# Patient Record
Sex: MALE | Race: WHITE | NOT HISPANIC OR LATINO | Employment: OTHER | URBAN - METROPOLITAN AREA
[De-identification: names, ages, dates, MRNs, and addresses within clinical notes are randomized per-mention and may not be internally consistent; named-entity substitution may affect disease eponyms.]

---

## 2017-03-21 ENCOUNTER — OFFICE VISIT (OUTPATIENT)
Dept: OTOLARYNGOLOGY | Facility: CLINIC | Age: 37
End: 2017-03-21
Payer: COMMERCIAL

## 2017-03-21 ENCOUNTER — TELEPHONE (OUTPATIENT)
Dept: SPEECH THERAPY | Facility: HOSPITAL | Age: 37
End: 2017-03-21

## 2017-03-21 VITALS
BODY MASS INDEX: 32.62 KG/M2 | WEIGHT: 233 LBS | DIASTOLIC BLOOD PRESSURE: 87 MMHG | HEIGHT: 71 IN | HEART RATE: 73 BPM | SYSTOLIC BLOOD PRESSURE: 122 MMHG

## 2017-03-21 DIAGNOSIS — R49.0 DYSPHONIA: ICD-10-CM

## 2017-03-21 DIAGNOSIS — R09.A2 GLOBUS SENSATION: ICD-10-CM

## 2017-03-21 DIAGNOSIS — J38.1 VOCAL FOLD POLYP: ICD-10-CM

## 2017-03-21 DIAGNOSIS — R49.8 VOICE STRAIN: ICD-10-CM

## 2017-03-21 DIAGNOSIS — R49.0 DYSPHONIA: Primary | ICD-10-CM

## 2017-03-21 DIAGNOSIS — K21.9 LPRD (LARYNGOPHARYNGEAL REFLUX DISEASE): Primary | ICD-10-CM

## 2017-03-21 DIAGNOSIS — R09.89 CHRONIC THROAT CLEARING: ICD-10-CM

## 2017-03-21 PROCEDURE — 31575 DIAGNOSTIC LARYNGOSCOPY: CPT | Mod: S$GLB,,, | Performed by: NURSE PRACTITIONER

## 2017-03-21 PROCEDURE — 1160F RVW MEDS BY RX/DR IN RCRD: CPT | Mod: S$GLB,,, | Performed by: NURSE PRACTITIONER

## 2017-03-21 PROCEDURE — 99999 PR PBB SHADOW E&M-NEW PATIENT-LVL III: CPT | Mod: PBBFAC,,, | Performed by: NURSE PRACTITIONER

## 2017-03-21 PROCEDURE — 99203 OFFICE O/P NEW LOW 30 MIN: CPT | Mod: 25,S$GLB,, | Performed by: NURSE PRACTITIONER

## 2017-03-21 RX ORDER — OMEPRAZOLE 40 MG/1
40 CAPSULE, DELAYED RELEASE ORAL
Qty: 30 CAPSULE | Refills: 11 | Status: SHIPPED | OUTPATIENT
Start: 2017-03-21 | End: 2018-05-07 | Stop reason: ALTCHOICE

## 2017-03-21 NOTE — PROGRESS NOTES
Subjective:       Patient ID: Marco Marie is a 36 y.o. male.    Chief Complaint: No chief complaint on file.    HPI   Patient reports that for at least the past 6 months he has been frequently hoarse, sensation of post-nasal drip, mucus sensation on or around the vocal cords make him feel like it could possible choke him. He has to speak on the phone and project his voice more forcefully when hoarseness. He works in IT, frequently talking to clients, voice breaks due to excess strain. PMH of HPV/condyloma, no mention of whether this has ever affected his aerodigestive tract.     Review of Systems   Constitutional: Negative.    HENT: Positive for postnasal drip and voice change. Negative for congestion, dental problem, rhinorrhea, sinus pressure, sneezing, sore throat and trouble swallowing.         Frequent throat clearing  Sensation of thick or too much mucus around or on vocal cords, feels like it could possible choke him  Lump sensation in the back of his throat   Eyes: Negative.  Negative for discharge, redness and itching.   Respiratory: Positive for choking.    Cardiovascular: Negative.    Gastrointestinal: Negative.    Musculoskeletal: Negative.    Skin: Negative.    Neurological: Negative.    Hematological: Negative.    Psychiatric/Behavioral: Negative.        Objective:      Physical Exam   Constitutional: He is oriented to person, place, and time. Vital signs are normal. He appears well-developed and well-nourished. He is cooperative. He does not appear ill. No distress.   HENT:   Head: Normocephalic and atraumatic.   Right Ear: Hearing, tympanic membrane, external ear and ear canal normal. Tympanic membrane is not erythematous. No middle ear effusion.   Left Ear: Hearing, tympanic membrane, external ear and ear canal normal. Tympanic membrane is not erythematous.  No middle ear effusion.   Nose: Nose normal. No mucosal edema or rhinorrhea. Right sinus exhibits no maxillary sinus tenderness and no  frontal sinus tenderness. Left sinus exhibits no maxillary sinus tenderness and no frontal sinus tenderness.   Mouth/Throat: Uvula is midline, oropharynx is clear and moist and mucous membranes are normal. Mucous membranes are not pale, not dry and not cyanotic. No oral lesions. No oropharyngeal exudate, posterior oropharyngeal edema or posterior oropharyngeal erythema.   Eyes: Conjunctivae, EOM and lids are normal. Pupils are equal, round, and reactive to light. Right eye exhibits no discharge. Left eye exhibits no discharge. No scleral icterus.   Neck: Trachea normal and normal range of motion. Neck supple. No tracheal deviation present. No thyroid mass and no thyromegaly present.   Cardiovascular: Normal rate.    Pulmonary/Chest: Effort normal. No stridor. No respiratory distress. He has no wheezes.   Musculoskeletal: Normal range of motion.   Lymphadenopathy:        Head (right side): No submental, no submandibular, no tonsillar, no preauricular and no posterior auricular adenopathy present.        Head (left side): No submental, no submandibular, no tonsillar, no preauricular and no posterior auricular adenopathy present.     He has no cervical adenopathy.        Right cervical: No superficial cervical and no posterior cervical adenopathy present.       Left cervical: No superficial cervical and no posterior cervical adenopathy present.   Neurological: He is alert and oriented to person, place, and time. He has normal strength. Coordination and gait normal.   Skin: Skin is warm, dry and intact. No lesion and no rash noted. He is not diaphoretic. No cyanosis. No pallor.   Psychiatric: He has a normal mood and affect. His speech is normal and behavior is normal. Judgment and thought content normal. Cognition and memory are normal.   Nursing note and vitals reviewed.      Procedure: Flexible laryngoscopy    In order to fully examine the upper aerodigestive tract, including the larynx, in a patient with a  hyperactive gag reflex, and suboptimal visualization with indirect mirror exam,  flexible endoscopy is required.   After explaining the procedure and obtaining verbal consent, a timeout was performed with the patient's participation according to the universal protocol. Both nasal cavities were anesthetized with 4% Xylocaine spray mixed with Bridger-Synephrine. The flexible laryngoscope  was inserted into the nasal cavity and advanced to visualize the nasal cavity, nasopharynx, the posterior oropharynx, hypopharynx, and the endolarynx with the  findings noted. The scope was removed and the procedure terminated. The patient tolerated this procedure well without apparent complication.     OVERALL FINDINGS  Nasopharynx - the torus is clear. There are no lesions of the posterior wall.   Oropharynx - no lesions of the tongue base. There is no obvious fullness or asymmetry.  Hypopharynx - there are no lesions of the pyriform sinuses or postcricoid region   Larynx - there are no lesions of the supraglottic or glottic larynx.  Vocal fold mobility is normal.     SPECIFIC FINDINGS  Adenoid tissue - normal   Nasopharynx & eustachian tube orifices - normal   Posterior pharyngeal wall - normal   Base of tongue - normal   Epiglottis - normal   Valleculae - normal   Pyriform sinuses - normal   False vocal cords - normal   True vocal cords - left anterior medial polyp  Arytenoids - normal   Interarytenoid space - erythema, edema           Assessment:     Dysphonia    LPRD/GERD  Left TVC polyp  Plan:     Laryngoscope photos given and reviewed in detail with patient. Recommend omeprazole QAM on an empty stomach, ranitidine QHS, and establish care with GI for continued management and possible EGD. Handouts given on LPRD and GERD, and discussed at length with patient.     See Dr. Jimenes regarding left VC polyp (given h/o HPV)

## 2017-03-21 NOTE — MR AVS SNAPSHOT
"    [place] [facility]  [address]  [place] [address]  Phone: [phone]  Fax: [phone]                  Marco Marie   3/21/2017 9:20 AM   Office Visit    Description:  Male : 1980   Provider:  Alida Lind NP   Department:  [place] [facility]           Reason for Visit     Post nasal drip     Hoarse     Sore Throat                To Do List           Goals (5 Years of Data)     None      [facility] -  Assistance  Registered nurses in the [facility] provide clinical advisement, health education, appointment booking, and other advisory services.  Call for this free service at [phone].             Medications           Message regarding Medications     Verify the changes and/or additions to your medication regime listed below are the same as discussed with your clinician today.  If any of these changes or additions are incorrect, please notify your healthcare provider.             Verify that the below list of medications is an accurate representation of the medications you are currently taking.  If none reported, the list may be blank. If incorrect, please contact your healthcare provider. Carry this list with you in case of emergency.           Current Medications     doxycycline (VIBRAMYCIN) 100 MG capsule     hydrocodone-acetaminophen (LORTAB)  mg per tablet     ketorolac (TORADOL) 10 mg tablet Take 10 mg by mouth every 8 (eight) hours.             Clinical Reference Information           Your Vitals Were     BP Pulse Height Weight BMI    122/87 73 5' 11" (1.803 m) 105.7 kg (233 lb 0.4 oz) 32.5 kg/m2      Blood Pressure          Most Recent Value    BP  122/87      Allergies as of 3/21/2017     No Known Drug Allergies      Immunizations Administered on Date of Encounter - 3/21/2017     None      Instructions      How Acid Reflux Affects Your Throat    Do you have to clear your throat or cough often? Are you hoarse? Do " "you have trouble swallowing? If you have these or other throat symptoms, you may have acid reflux. This occurs when stomach acid flows back up and irritates your throat.  Why you have throat symptoms  There are muscles (esophageal sphincters) at both ends of the tube that carries food to your stomach (the esophagus). These muscles relax to let food pass. Then they tighten to keep stomach acid down. When the lower esophageal sphincter (LES) doesnt tighten enough, acid can flow back (reflux) from your stomach into your esophagus. This may cause heartburn. In some cases the upper esophageal sphincter (UES) also doesnt work well. Then acid can travel higher and enter your throat (pharynx). In many cases, this causes throat symptoms.  Common throat symptoms  · Need to clear your throat often  · Feeling like youre choking  · Long-term (chronic) cough  · Hoarseness  · Trouble swallowing  · Feel like you have a lump in your throat  · Sour or acid taste  · Sore throat that keeps coming back     LARYNGOPHARYNGEAL REFLUX  (SILENT OR ATYPICAL REFLUX)    If you have any of the following symptoms you may have laryngopharyngeal reflux (LPR):  hoarseness, thick or too much mucus, chronic throat pain/irritation, chronic throat clearing, chronic cough, especially cough that wake you up from sleep, chronic "postnasal drip" without the need to blow your nose.     Many people with LPR do not have symptoms of heartburn. Compared to the esophagus, the voice box and the back of the throat are significantly more sensitive to the effects of acid on surrounding tissue. Acid passing quickly through the esophagus does not have a chance to irritate the area for too long.  However acid that pools in the throat or voice box can cause prolonged irritation resulting in the symptoms of LPR.    The symptoms of LPR can consist of a dry cough and the sensation of something being stuck in the throat.  Some people will complain of heartburn while " "others may have intermittent hoarseness or loss of voice.  Another major symptom of LPR is "postnasal drip."  Patients are often told symptoms are due to abnormal nasal drainage or sinus infection; however this is rarely the cause of chronic throat irritation. For post nasal drip to cause the complaints described, signs and symptoms of an active nasal infection should be present.     Treatments for LPR include:  postural changes, weight reduction, diet modification, medication to reduce stomach acid and promote normal motility, and surgery to prevent reflux. Most patients will begin to notice some relief in her symptoms about 2 weeks after starting the medication; however it is generally recommended the medication should be continued for 2 months. If the symptoms completely resolve, the medication can then be tapered.  Some people will remain symptom free while others may have relapses which required treatment again.    Things you can do to prevent reflux include:  Do not smoke.  Smoking will cause reflux.  Avoid tight fitting clothes or belts around the waist.  Avoid eating at least 2 hours prior to bedtime.  In fact avoid eating your largest meal at night.  Weight loss.  For patient's with recent weight gain, shedding a few pounds is all that is required to improve reflux.  Avoid caffeine, cola beverages, citrus beverages, mints, alcoholic beverages, particularly at night, cheese, fried foods, spicy foods, eggs, and chocolate.  Sleep with the head of bed elevated at least 6 inches.    Take Omeprazole / Prilosec (PPI) every morning on an empty stomach (30-60 minutes before eating) 20-40 MG. At bedtime take Zantac (H2-blocker) 150-300 mg.  All are available over-the-counter without a prescription. See a Gastro doctor (GI) for further evaluation and continued management.    Dr. Benjie Jimenes [phone]             Language Assistance Services     ATTENTION: Language assistance services are available, free of " charge. Please call [phone].      ATENCIÓN: Si habla español, tiene a kingsley disposición servicios gratuitos de asistencia lingüística. Llame al [phone].     CHÚ Ý: N?u b?n nói Ti?ng Vi?t, có các d?ch v? h? tr? ngôn ng? mi?n phí dành cho b?n. G?i s? [phone].         [facility] complies with applicable Federal civil rights laws and does not discriminate on the basis of race, color, national origin, age, disability, or sex.

## 2017-03-21 NOTE — PATIENT INSTRUCTIONS
"  How Acid Reflux Affects Your Throat    Do you have to clear your throat or cough often? Are you hoarse? Do you have trouble swallowing? If you have these or other throat symptoms, you may have acid reflux. This occurs when stomach acid flows back up and irritates your throat.  Why you have throat symptoms  There are muscles (esophageal sphincters) at both ends of the tube that carries food to your stomach (the esophagus). These muscles relax to let food pass. Then they tighten to keep stomach acid down. When the lower esophageal sphincter (LES) doesnt tighten enough, acid can flow back (reflux) from your stomach into your esophagus. This may cause heartburn. In some cases the upper esophageal sphincter (UES) also doesnt work well. Then acid can travel higher and enter your throat (pharynx). In many cases, this causes throat symptoms.  Common throat symptoms  · Need to clear your throat often  · Feeling like youre choking  · Long-term (chronic) cough  · Hoarseness  · Trouble swallowing  · Feel like you have a lump in your throat  · Sour or acid taste  · Sore throat that keeps coming back     LARYNGOPHARYNGEAL REFLUX  (SILENT OR ATYPICAL REFLUX)    If you have any of the following symptoms you may have laryngopharyngeal reflux (LPR):  hoarseness, thick or too much mucus, chronic throat pain/irritation, chronic throat clearing, chronic cough, especially cough that wake you up from sleep, chronic "postnasal drip" without the need to blow your nose.     Many people with LPR do not have symptoms of heartburn. Compared to the esophagus, the voice box and the back of the throat are significantly more sensitive to the effects of acid on surrounding tissue. Acid passing quickly through the esophagus does not have a chance to irritate the area for too long.  However acid that pools in the throat or voice box can cause prolonged irritation resulting in the symptoms of LPR.    The symptoms of LPR can consist of a dry cough " "and the sensation of something being stuck in the throat.  Some people will complain of heartburn while others may have intermittent hoarseness or loss of voice.  Another major symptom of LPR is "postnasal drip."  Patients are often told symptoms are due to abnormal nasal drainage or sinus infection; however this is rarely the cause of chronic throat irritation. For post nasal drip to cause the complaints described, signs and symptoms of an active nasal infection should be present.     Treatments for LPR include:  postural changes, weight reduction, diet modification, medication to reduce stomach acid and promote normal motility, and surgery to prevent reflux. Most patients will begin to notice some relief in her symptoms about 2 weeks after starting the medication; however it is generally recommended the medication should be continued for 2 months. If the symptoms completely resolve, the medication can then be tapered.  Some people will remain symptom free while others may have relapses which required treatment again.    Things you can do to prevent reflux include:  Do not smoke.  Smoking will cause reflux.  Avoid tight fitting clothes or belts around the waist.  Avoid eating at least 2 hours prior to bedtime.  In fact avoid eating your largest meal at night.  Weight loss.  For patient's with recent weight gain, shedding a few pounds is all that is required to improve reflux.  Avoid caffeine, cola beverages, citrus beverages, mints, alcoholic beverages, particularly at night, cheese, fried foods, spicy foods, eggs, and chocolate.  Sleep with the head of bed elevated at least 6 inches.    Take Omeprazole / Prilosec (PPI) every morning on an empty stomach (30-60 minutes before eating) 20-40 MG. At bedtime take Zantac (H2-blocker) 150-300 mg.  All are available over-the-counter without a prescription. See a Gastro doctor (GI) for further evaluation and continued management.    Dr. Benjie Jimenes [phone]        "

## 2017-04-10 ENCOUNTER — CLINICAL SUPPORT (OUTPATIENT)
Dept: SPEECH THERAPY | Facility: HOSPITAL | Age: 37
End: 2017-04-10
Payer: COMMERCIAL

## 2017-04-10 ENCOUNTER — INITIAL CONSULT (OUTPATIENT)
Dept: OTOLARYNGOLOGY | Facility: CLINIC | Age: 37
End: 2017-04-10
Payer: COMMERCIAL

## 2017-04-10 VITALS
SYSTOLIC BLOOD PRESSURE: 112 MMHG | DIASTOLIC BLOOD PRESSURE: 70 MMHG | HEART RATE: 50 BPM | WEIGHT: 232.56 LBS | BODY MASS INDEX: 32.44 KG/M2

## 2017-04-10 DIAGNOSIS — J38.1 VOCAL FOLD POLYP: ICD-10-CM

## 2017-04-10 DIAGNOSIS — F44.4 HYPERFUNCTIONAL DYSPHONIA: ICD-10-CM

## 2017-04-10 DIAGNOSIS — R49.9 VOICE DISTURBANCE: Primary | ICD-10-CM

## 2017-04-10 DIAGNOSIS — J38.3 VOCAL FOLD SCAR: ICD-10-CM

## 2017-04-10 DIAGNOSIS — R49.0 DYSPHONIA: ICD-10-CM

## 2017-04-10 PROCEDURE — G9172 VOICE GOAL STATUS: HCPCS | Mod: GN,CH | Performed by: SPEECH-LANGUAGE PATHOLOGIST

## 2017-04-10 PROCEDURE — 99999 PR PBB SHADOW E&M-EST. PATIENT-LVL III: CPT | Mod: PBBFAC,,, | Performed by: OTOLARYNGOLOGY

## 2017-04-10 PROCEDURE — G9173 VOICE D/C STATUS: HCPCS | Mod: GN,CJ | Performed by: SPEECH-LANGUAGE PATHOLOGIST

## 2017-04-10 PROCEDURE — 92524 BEHAVRAL QUALIT ANALYS VOICE: CPT | Mod: GN | Performed by: SPEECH-LANGUAGE PATHOLOGIST

## 2017-04-10 PROCEDURE — G9171 VOICE CURRENT STATUS: HCPCS | Mod: GN,CK | Performed by: SPEECH-LANGUAGE PATHOLOGIST

## 2017-04-10 PROCEDURE — 31579 LARYNGOSCOPY TELESCOPIC: CPT | Mod: S$GLB,,, | Performed by: OTOLARYNGOLOGY

## 2017-04-10 PROCEDURE — 99213 OFFICE O/P EST LOW 20 MIN: CPT | Mod: 25,S$GLB,, | Performed by: OTOLARYNGOLOGY

## 2017-04-10 PROCEDURE — 1160F RVW MEDS BY RX/DR IN RCRD: CPT | Mod: S$GLB,,, | Performed by: OTOLARYNGOLOGY

## 2017-04-10 PROCEDURE — 92520 LARYNGEAL FUNCTION STUDIES: CPT | Mod: GN | Performed by: SPEECH-LANGUAGE PATHOLOGIST

## 2017-04-10 NOTE — PATIENT INSTRUCTIONS
ACID REFLUX   What is acid reflux?    When we eat, food passes from the throat and into the stomach through a tube called the esophagus. At the bottom of the esophagus is a ring of muscles that acts as a valve between the esophagus and stomach, called the lower esophageal sphincter. Smoking, alcohol, and certain types of food may weaken the sphincter, so it may stop closing properly. The contents in the stomach then may leak back, or reflux, into the esophagus. This problem is called gastroesophageal reflux disease (GERD). Symptoms of GERD include heartburn, belching, regurgitation of stomach contents, and swallowing difficulties.    Sometimes, the stomach acid travels up through the esophagus and spills into the larynx or pharynx (voice box). This is called laryngopharyngeal reflux (LPR) and is irritating to the vocal folds and surrounding tissues. Often, patients with LPR do not experience heartburn as a symptom. More commonly, symptoms of LPR include hoarseness, excessive mucous resulting in frequent throat clearing, post-nasal drip, coughing, throat soreness or burning, choking episodes, difficulty swallowing, and sensation of a lump in the throat.     How is acid reflux treated?   Treatment for acid reflux can involve any combination of medication, lifestyle modifications, and surgery.   · Medications. Your doctor may prescribe a proton pump inhibitor (PPI) or an H2 blocker. If you are prescribed a PPI, take in on an empty stomach in the morning 30 minutes prior to eating breakfast. Keep in mind that it may take 4-6 weeks before symptoms begin to resolve, so do not stop medications without consulting your doctor.   · Lifestyle and dietary modifications. Eat smaller meals at a slower pace. Avoid over-eating. If you are overweight, try to lose weight. Do not lie down or exercise directly after eating; eat your last meal of the day at least 2-3 hours prior to going to sleep. Avoid tight-fitting clothes. If you  are a smoker, reduce or quit smoking. Elevate your head of bed 4-6 inches by putting phone books under the legs at the head of your bed or buy a wedge pillow, but do not use more than two regular pillows as this causes the body to curl and compresses your stomach.     Food group Foods to avoid to reduce reflux   Beverages  Whole milk, 2% milk, chocolate milk/hot chocolate, alcohol, coffee (regular and decaf), caffeinated tea, mint tea, carbonated beverages, citrus juice    Breads/grains Commercial sweet rolls, doughnuts, croissants, and other high-fat pastries    Fruits and vegetables Fried or cream-style vegetables, tomatoes, tomato-based products, citrus fruits, hot peppers    Soups and seasonings Cream, cheese, tomato-based soups, vinegar    Meats and proteins Fatty or fried meat/fish, barber, sausage, pepperoni, lunch meat, fried eggs    Fats and oil Lard, barber drippings, salt pork, meat drippings, gravies, highly seasoned salad dressings, nuts    Sweets/desserts Anything made with or from chocolate, peppermint, spearmint, whole milk, or cream; high-fat pastries, gum, hard candy         THROAT CLEARING     Why am I clearing my throat so often?   Irritation of the vocal folds and surrounding area can cause the urge to clear your throat. This irritation can be caused by acid reflux, allergies, or environmental factors, as well as from a cold or sore throat. Talk with your doctor about the treatment of possible underlying causes. However, throat clearing can turn into a cycle. You clear your throat after feeling an irritation, which causes more irritation, which causes you to continue clearing your throat, which causes more irritation.     What can I do about it?   · Ask a friend or family member to help you keep track - you may not even realize how often you do it.   · Replace the throat clearing with a different behavior.   · Take a sip of water and then swallow. Repeat until the sensation subsides.  · Swallow  hard (even without water)   · Use the silent throat clear method by making the h sound when you exhale  · Breathe in deeply through your nose and exhale on shhh   · Hum quietly   · Keep yourself hydrated.   · Drink plenty of water   · Eat wet snacks (grapes, apples, melon, cucumber)   · Use a humidifier at home or at work   · Suck on hard candies, but avoid too much sugar and avoid anything with mint, menthol, camphor, or eucaplyptus, as these will have a more irritating effect.        VOCAL HYGIENE AND HYDRATION RECOMMENDATIONS     DO   · Drink plenty of waterabout  oz a day! If your urine is pale, you should be well-hydrated.  Try some of these tips to increase hydration as well.  · Eat wet snacks such as grapes, melon, cucumbers, apple slices   · Reduce intake coffee and other caffeinated and carbonated beverages   · Suck on pastille lozenges or sugar free candies (not mentholated lozenges)   · Use a room or personal humidifier   · Use sinus rinses/irrigations or nasal saline spray   · Inhale steam for a few minutes before speaking or singing   · Pay attention to how, and how much, you use your voice.   · Give yourself vocal breaks throughout the day.   · Breathe when talking, take frequent pauses for breath.   · Use a microphone when speaking in front of a group of 15 or more to reduce voice strain.   · Learn how to use your voice correctly to get loud with voice therapy techniques.  · Stay healthy. Eat right and get plenty of rest. Follow a reflux precaution diet if indicated.   ____________________________________________________________________    REDUCE   · Loud talking, yelling, cheering, or screaming. Voice injury can take place over a long time, or all at once.  If you need to talk loud or yell, be sure you are doing it properly.   · Clearing your throat and forceful coughing. The vocal folds collect mucus when irritated or inflamed and this can cause the urge to clear your throat. The  trauma of clearing the throat creates more inflammation and mucus. See tips above for keeping hydrated to assist with cutting back on throat clearing.  Instead of clearing your throat, try these behaviors until the sensation passes:   · Take a sip of water   · Silently clear with a hard exhale making an hhh sound   · Swallow hard   · Drying agents such as anti-histamines or decongestant medications. You should always take medications as prescribed, but keep in mind these will dry you out, so increase your hydration!   ____________________________________________________________________    AVOID   · Inhalant irritants such as smoke, strong fumes, and allergens. Take advantage of 1-800-QUIT-NOW if you are ready to stop smoking.   · Unnecessary non-speech noises, such as grunting during exercise, loud noises, or excessively high- or low-pitched sounds. Save your voice for talking and singing!   · Whispering. Whispering places your vocal folds in a tenser position than usual.

## 2017-04-10 NOTE — LETTER
April 11, 2017      Alida Lind NP  [address] [facility] [address] [place] [address]           [facility]  [address]  [facility] [address]  Phone: [phone]  Fax: [phone]          Patient: Marco Marie   MR Number: [id number]   YOB: 1980   Date of Visit: 4/10/2017       Dear Alida Lind:    Thank you for referring Marco Marie to me for evaluation. Attached you will find relevant portions of my assessment and plan of care.    If you have questions, please do not hesitate to call me. I look forward to following Marco Marie along with you.    Sincerely,    Benjie Jimenes MD    Enclosure  CC:  No Recipients    If you would like to receive this communication electronically, please contact [email or web address] or [phone] to request more information on [employer] Link access.    For providers and/or their staff who would like to refer a patient to [facility], please contact us through our one-stop-shop provider referral line, [facility], at [phone].    If you feel you have received this communication in error or would no longer like to receive these types of communications, please e-mail [email or web address]

## 2017-04-10 NOTE — MR AVS SNAPSHOT
[facility]  [address]  [facility] [address]  Phone: [phone]  Fax: [phone]                  Marco Marie   4/10/2017 8:40 AM   Initial consult    Description:  Male : 1980   Provider:  Benjie Jimenes MD   Department:  [facility]           Reason for Visit     polyp back of throat           Diagnoses this Visit        Comments    Voice disturbance    -  Primary     Hyperfunctional dysphonia         Vocal fold polyp         Vocal fold scar                To Do List           Future Appointments        Provider Department Dept Phone    4/10/2017 11:00 AM Geneva Osullivan, [facility] [phone]    2017 2:00 PM JUAN De Anda [facility] - Allergy/ Immunology [phone]      Goals (5 Years of Data)     None      Follow-Up and Disposition     Return in about 2 months (around 6/10/2017).      [facility]     [facility] Care [facility] -  Assistance  Unless otherwise directed by your provider, please contact [facility], our nurse care line that is available for  assistance.     Registered nurses in the [facility] provide: appointment scheduling, clinical advisement, health education, and other advisory services.  Call: [phone] (toll free)               Medications           Message regarding Medications     Verify the changes and/or additions to your medication regime listed below are the same as discussed with your clinician today.  If any of these changes or additions are incorrect, please notify your healthcare provider.             Verify that the below list of medications is an accurate representation of the medications you are currently taking.  If none reported, the list may be blank. If incorrect, please contact your healthcare provider. Carry this list with you in case of emergency.           Current Medications     doxycycline (VIBRAMYCIN)  100 MG capsule     hydrocodone-acetaminophen (LORTAB)  mg per tablet     ketorolac (TORADOL) 10 mg tablet Take 10 mg by mouth every 8 (eight) hours.      omeprazole (PRILOSEC) 40 MG capsule Take 1 capsule (40 mg total) by mouth before breakfast. Take on empty stomach 30-60 minutes before meal    ranitidine (ZANTAC) 300 MG tablet Take 1 tablet (300 mg total) by mouth every evening.           Clinical Reference Information           Your Vitals Were     BP Pulse Weight BMI       112/70 50 105.5 kg (232 lb 9.4 oz) 32.44 kg/m2       Blood Pressure          Most Recent Value    BP  112/70      Allergies as of 4/10/2017     No Known Drug Allergies      Immunizations Administered on Date of Encounter - 4/10/2017     None      Instructions      ACID REFLUX   What is acid reflux?    When we eat, food passes from the throat and into the stomach through a tube called the esophagus. At the bottom of the esophagus is a ring of muscles that acts as a valve between the esophagus and stomach, called the lower esophageal sphincter. Smoking, alcohol, and certain types of food may weaken the sphincter, so it may stop closing properly. The contents in the stomach then may leak back, or reflux, into the esophagus. This problem is called gastroesophageal reflux disease (GERD). Symptoms of GERD include heartburn, belching, regurgitation of stomach contents, and swallowing difficulties.    Sometimes, the stomach acid travels up through the esophagus and spills into the larynx or pharynx (voice box). This is called laryngopharyngeal reflux (LPR) and is irritating to the vocal folds and surrounding tissues. Often, patients with LPR do not experience heartburn as a symptom. More commonly, symptoms of LPR include hoarseness, excessive mucous resulting in frequent throat clearing, post-nasal drip, coughing, throat soreness or burning, choking episodes, difficulty swallowing, and sensation of a lump in the throat.     How is acid reflux  treated?   Treatment for acid reflux can involve any combination of medication, lifestyle modifications, and surgery.   · Medications. Your doctor may prescribe a proton pump inhibitor (PPI) or an H2 blocker. If you are prescribed a PPI, take in on an empty stomach in the morning 30 minutes prior to eating breakfast. Keep in mind that it may take 4-6 weeks before symptoms begin to resolve, so do not stop medications without consulting your doctor.   · Lifestyle and dietary modifications. Eat smaller meals at a slower pace. Avoid over-eating. If you are overweight, try to lose weight. Do not lie down or exercise directly after eating; eat your last meal of the day at least 2-3 hours prior to going to sleep. Avoid tight-fitting clothes. If you are a smoker, reduce or quit smoking. Elevate your head of bed 4-6 inches by putting phone books under the legs at the head of your bed or buy a wedge pillow, but do not use more than two regular pillows as this causes the body to curl and compresses your stomach.     Food group Foods to avoid to reduce reflux   Beverages  Whole milk, 2% milk, chocolate milk/hot chocolate, alcohol, coffee (regular and decaf), caffeinated tea, mint tea, carbonated beverages, citrus juice    Breads/grains Commercial sweet rolls, doughnuts, croissants, and other high-fat pastries    Fruits and vegetables Fried or cream-style vegetables, tomatoes, tomato-based products, citrus fruits, hot peppers    Soups and seasonings Cream, cheese, tomato-based soups, vinegar    Meats and proteins Fatty or fried meat/fish, barber, sausage, pepperoni, lunch meat, fried eggs    Fats and oil Lard, barber drippings, salt pork, meat drippings, gravies, highly seasoned salad dressings, nuts    Sweets/desserts Anything made with or from chocolate, peppermint, spearmint, whole milk, or cream; high-fat pastries, gum, hard candy         THROAT CLEARING     Why am I clearing my throat so often?   Irritation of the vocal folds  and surrounding area can cause the urge to clear your throat. This irritation can be caused by acid reflux, allergies, or environmental factors, as well as from a cold or sore throat. Talk with your doctor about the treatment of possible underlying causes. However, throat clearing can turn into a cycle. You clear your throat after feeling an irritation, which causes more irritation, which causes you to continue clearing your throat, which causes more irritation.     What can I do about it?   · Ask a friend or family member to help you keep track - you may not even realize how often you do it.   · Replace the throat clearing with a different behavior.   · Take a sip of water and then swallow. Repeat until the sensation subsides.  · Swallow hard (even without water)   · Use the silent throat clear method by making the h sound when you exhale  · Breathe in deeply through your nose and exhale on shhh   · Hum quietly   · Keep yourself hydrated.   · Drink plenty of water   · Eat wet snacks (grapes, apples, melon, cucumber)   · Use a humidifier at home or at work   · Suck on hard candies, but avoid too much sugar and avoid anything with mint, menthol, camphor, or eucaplyptus, as these will have a more irritating effect.        VOCAL HYGIENE AND HYDRATION RECOMMENDATIONS     DO   · Drink plenty of waterabout  oz a day! If your urine is pale, you should be well-hydrated.  Try some of these tips to increase hydration as well.  · Eat wet snacks such as grapes, melon, cucumbers, apple slices   · Reduce intake coffee and other caffeinated and carbonated beverages   · Suck on pastille lozenges or sugar free candies (not mentholated lozenges)   · Use a room or personal humidifier   · Use sinus rinses/irrigations or nasal saline spray   · Inhale steam for a few minutes before speaking or singing   · Pay attention to how, and how much, you use your voice.   · Give yourself vocal breaks throughout the day.   · Breathe  when talking, take frequent pauses for breath.   · Use a microphone when speaking in front of a group of 15 or more to reduce voice strain.   · Learn how to use your voice correctly to get loud with voice therapy techniques.  · Stay healthy. Eat right and get plenty of rest. Follow a reflux precaution diet if indicated.   ____________________________________________________________________    REDUCE   · Loud talking, yelling, cheering, or screaming. Voice injury can take place over a long time, or all at once.  If you need to talk loud or yell, be sure you are doing it properly.   · Clearing your throat and forceful coughing. The vocal folds collect mucus when irritated or inflamed and this can cause the urge to clear your throat. The trauma of clearing the throat creates more inflammation and mucus. See tips above for keeping hydrated to assist with cutting back on throat clearing.  Instead of clearing your throat, try these behaviors until the sensation passes:   · Take a sip of water   · Silently clear with a hard exhale making an hhh sound   · Swallow hard   · Drying agents such as anti-histamines or decongestant medications. You should always take medications as prescribed, but keep in mind these will dry you out, so increase your hydration!   ____________________________________________________________________    AVOID   · Inhalant irritants such as smoke, strong fumes, and allergens. Take advantage of 1-800-QUIT-NOW if you are ready to stop smoking.   · Unnecessary non-speech noises, such as grunting during exercise, loud noises, or excessively high- or low-pitched sounds. Save your voice for talking and singing!   · Whispering. Whispering places your vocal folds in a tenser position than usual.        Language Assistance Services     ATTENTION: Language assistance services are available, free of charge. Please call [phone].      ATENCIÓN: Si nate rose, tiene a kingsley disposición servicios  kervinos de asistencia lingüística. Regis ramon [phone].     YOLY Ý: N?u b?n nói Ti?ng Vi?t, có các d?ch v? h? tr? ngôn ng? mi?n phí dành cho b?n. G?i s? [phone].         [facility] complies with applicable Federal civil rights laws and does not discriminate on the basis of race, color, national origin, age, disability, or sex.

## 2017-04-10 NOTE — PROGRESS NOTES
for a [employer]related company    [facility] [place]  Department of Otorhinolaryngology and Communication Sciences    Marco Marie is a 36 y.o. male who [facility] the [facility] for consultation at the kind request of Alida Lind for further management of hoarseness.     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 He says that he has always talked loudly, as has everyone else in his family. He complains of hoarseness, vocal fatigue, vocal strain, and odynophonia. Onset was gradual. Duration is about 6 months, with intermittent loss of voice for about a day or so. Time course is constant with periodic exacerbations. Symptoms are worsening. Exacerbating factors include voice use. He denies any alleviating factors. Associated symptoms include throat discomfort and throat clearing.     Since seeing Ms. Lind, he has been taking omeprazole 40 mg and ranitidine 300 mg.    Voice Handicap Index-10 (VHI-10) score is 29.   Reflux Symptom Index (RSI) score is 26.       Past Medical History  Healthy.    Past Surgical History  He has a past surgical history that includes tonsillectomy (years ago) and Shoulder arthroscopy (5 yrs ago).    Family History  His family history includes Hypertension in his father.    Social History  He reports that he has never smoked. He has never used smokeless tobacco. He reports that he drinks alcohol. He reports that he does not use illicit drugs.    Allergies  He is allergic to no known drug allergies.    Medications  Omeprazole 40 mg Qday and Ranitidine 300 mg QHS.    Review of Systems   Constitutional: Negative for fever.   HENT: Positive for sore throat.    Eyes: Negative for visual disturbance.   Respiratory: Positive for wheezing.    Cardiovascular: Negative for chest pain.   Gastrointestinal: Negative for nausea.   Musculoskeletal: Negative for arthralgias.   Skin: Negative for rash.   Neurological: Negative for tremors.   Hematological: Does not  bruise/bleed easily.   Psychiatric/Behavioral: The patient is not nervous/anxious.           Objective:     /70  Pulse (!) 50  Wt 105.5 kg (232 lb 9.4 oz)  BMI 32.44 kg/m2   Physical Exam    Constitutional: comfortable, well dressed  Psychiatric: appropriate affect  Respiratory: comfortably breathing, symmetric chest rise, no stridor  Voice: mild-moderate roughness and strain; trace inconsistent breathiness  Cardiovascular: upper extremities non-edematous  Lymphatic: no cervical lymphadenopathy  Neurologic: alert and oriented to time, place, person, and situation; cranial nerves 3-12 grossly intact  Head: normocephalic  Eyes: conjunctivae and sclerae clear  Ears: normal pinnae, normal external auditory canals, tympanic membranes intact  Nose: mucosa pink and noncongested, no masses, no mucopurulence, no polyps  Oral cavity / oropharynx: no mucosal lesions  Neck: soft, full range of motion, laryngotracheal complex palpable with appropriate landmarks, larynx elevates on swallowing  Indirect laryngoscopy: limited due to gag    Procedure  Rigid Laryngeal Videostroboscopy (24091): Laryngeal videostroboscopy is indicated to assess the laryngeal vibratory biomechanics and vocal fold oscillation, which cannot be assessed with a plain light examination. This was carried out with a 70 degree endoscope. After verbal consent was obtained, the patient was positioned and the tongue was gently secured with a gauze sponge. The endoscope was passed transorally and positioned to image the larynx and hypopharynx in detail. The following featured were examined: laryngeal and hypopharyngeal masses; vocal fold range and symmetry of motion; laryngeal mucosal edema, erythema, inflammation, and hydration; salivary pooling; and gross laryngeal sensation. During phonation, the vocal folds were assesses for glottal closure; mucosal wave; vocal fold lesions; vibratory periodicity, amplitude, and phase symmetry; and vertical height  match. The equipment was removed. The patient tolerated the procedure well without complication. 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 male with chronic dysphonia. Videostroboscopy demonstrates bilateral phonotraumatic changes and a left submucosal lesion which I also suspect to be phonotraumatic in etiology. I suspect these abnormalities to have been present for a long time, with some more recent mild exacerbation.       Plan:        I had a discussion with the patient regarding his condition and the further workup and management options.      Voice evaluation and subsequent therapy sessions are medically necessary for restoration of laryngeal function. We will arrange for this to occur here at the [facility] in the coming weeks. I recommended some vocal hygiene and voice conservation measures, including use of an amplification device. I educated the patient on the impact of reflux on laryngopharyngeal disorders and provided suggestions on diet and lifestyle modifications that may help improve control of ongoing reflux. I can account for his dysphonia in the absence of reflux, but for now I recommended that he continue his omeprazole and decrease to 150 QHS on his ranitidine.    He will follow up with me in 2 month(s), or sooner if needed.    All questions were answered, and the patient is in agreement with the above.     Benjie Jimenes M.D.  [facility]  Department of Otorhinolaryngology and Communication Sciences

## 2017-04-10 NOTE — PROGRESS NOTES
"Referring provider: Alida Lind  Reason for visit:  Behavioral and qualitative analysis of voice and resonance (CPT 02004)  Laryngeal function studies (CPT 51417)    Subjective / History    Marco Marie is a 36 y.o. male referred for voice evaluation (CPT 00264, 75428) by Dr. Lind.  He presents with complaints of hoarseness and vocal fatigue which began about 6 months ago during basketball season.  It became worse during the season, and he expected it to return when the season ended, but it remained hoarse.  The patient also endorses: fatigue with use, tightness and reduced volume.  He is a high-demand voice user as a professional, a parent, a , and a .  He reports vocal fatigue and increased effort with prolonged use. He reports he sucks on cough drops frequently.  He just began coaching baseball season but reports he has been doing more "one on one" coaching and having others do the yelling.   Swallowing: no c/o   Breathing: throat clearing    Smokin packs/day   Caffeine: 2-3 cups/day     Stroboscopy findings (per Dr. 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  Current Outpatient Prescriptions on File Prior to Visit   Medication Sig Dispense Refill    doxycycline (VIBRAMYCIN) 100 MG capsule       hydrocodone-acetaminophen (LORTAB)  mg per tablet       ketorolac (TORADOL) 10 mg tablet Take 10 mg by mouth every 8 (eight) hours.        omeprazole (PRILOSEC) 40 MG capsule Take 1 capsule (40 mg total) by mouth before breakfast. Take on empty stomach 30-60 minutes before meal " "30 capsule 11    ranitidine (ZANTAC) 300 MG tablet Take 1 tablet (300 mg total) by mouth every evening. 30 tablet 11     No current facility-administered medications on file prior to visit.        Objective    Perceptual/behavioral assessment  -CAPE-V Overall Score: 58  -Quality: strained, breathy  -Volume: decreased projection  -Pitch: appropriate for age and gender  -Flexibility: diminished  -Habitual respiratory pattern: diaphragmatic.    Acoustic/aerodynamic assessment  Multi-Dimensional Voice Program (MDVP) Analysis (sustained "ah")   Baseline Post-trial tx Gender-matched norms (M)   Average fundamental frequency (Fo) 131.124 Hz 137.235 Hz Norm/SD: 145.2 / 23.41     Relative average perturbation 1.646% 0.844% Norm/SD: 0.345 / 0.33     Shimmer percent 8.408% 6.923% Norm/SD: 2.52 / 0.997     Noise to harmonic ratio 0.181 0.149 Norm/SD: 0.12 / 0.014     Voice turbulence index 0.068 0.058 Norm/SD: 0.05 / 0.016       Phonatory Aerodynamic System (PAS) Analysis (running speech)  Maximum SPL   87.23  dB  Mean Pitch   134.73 Hz  Pitch Range   149.23 Hz  Phonation Time  11.90  Sec  Expiratory Airflow Duration 16.54  Sec  Inspiratory Airflow Duration 4.17  Sec  Peak Expiratory Airflow 1.86  Lit/Sec  Expiratory Volume  3.88  Liters  Peak Inspiratory Airflow -2.00  Lit/Sec  Inspiratory Volume  -3.41  Liters    Patient self-perception scales  -Voice Handicap Index-10 (completed to assess self-perceived handicap associated with dysphonia; >11 considered abnormal): 29  -Reflux Symptom Index (completed to assess the possible presence and/or severity of LPR symptoms and any relationship between this condition and the pt's dysphagia; score of ~15 may indicate LPR): 26    Education / Stimulability Trials   Discussed importance of vocal hygiene including: hydration, conservation and reducing throat clearing, coughing, other phonotraumatic behaviors. Patient was stimulable for improved voice using semi-occluded vocal tract " exercises.  After 1-2 trials of straw phonation, he was able to sense the improvement in oral resonance and the reduced extralaryngeal tension as well as improved voice quality.  He was guided through daily practices and encouraged to use this clearer, more efficient voice as often as possible.  Discussed voice moderation and reducing yelling.  He was amenable to all suggestions.     Functional goals  Length Status Goal   Long term Initiated Patient will implement and adhere to vocal hygiene protocols on a daily basis, including the elimination of phonotraumatic behaviors.    Long term Initiated Patient and clinician will facilitate changes in vocal function in order to restore functional use of voice for daily occupational, social, and emotional demands.    Long term Initiated Patient will re-establish phonation with adequate balance of airflow and resonance with decreased muscle tension.    Long term Initiated Patient will maximize efficiency of the vocal mechanism relative to existing laryngeal disorder through coordinating subsystems of voice within 12 weeks as evidenced by patient report and SLP observations.   Short term Initiated Patient will coordinate vocal subsystems in hierarchical speech tasks by producing sound in an efficient manner yielding improved or normal voice quality and vocal endurance in the presence of existing laryngeal disorder with 90% accuracy independently.   Short term Initiated Patient will complete SOVT exercises and/or resonant-focused exercises 3-5x daily to strengthen and balance the intrinsic laryngeal musculature and maximize glottic closure without medial hyperfunction.   Short term Initiated Patient will increase awareness for voice conservations behaviors by following the 50/10 rule and reduce voice use in competing noise environments.    Short term Initiated Patient will demonstrate the ability to increase awareness of voicing behavior through self-monitoring to facilitate  generalization in functional speaking situations with 80% accuracy.        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 Jimenes.  Prognosis for continued improvement is good.     G-Codes for Voice  Current status:   - CK   Projected status:  - CH   Discharge status:  - CJ     Recommendations / POC    Recommend 2-4 sessions of voice/speech therapy over 3-6 weeks with a speech-language pathologist to optimize glottal postures for improved vocal function, vocal efficiency, and ease of phonation.  He should continue the exercises as discussed in session and should contact me with any further questions.

## 2017-05-16 ENCOUNTER — PATIENT MESSAGE (OUTPATIENT)
Dept: OTOLARYNGOLOGY | Facility: CLINIC | Age: 37
End: 2017-05-16

## 2017-05-17 ENCOUNTER — TELEPHONE (OUTPATIENT)
Dept: OTOLARYNGOLOGY | Facility: CLINIC | Age: 37
End: 2017-05-17

## 2017-05-26 ENCOUNTER — OFFICE VISIT (OUTPATIENT)
Dept: OTOLARYNGOLOGY | Facility: CLINIC | Age: 37
End: 2017-05-26
Payer: COMMERCIAL

## 2017-05-26 ENCOUNTER — CLINICAL SUPPORT (OUTPATIENT)
Dept: SPEECH THERAPY | Facility: HOSPITAL | Age: 37
End: 2017-05-26
Payer: COMMERCIAL

## 2017-05-26 VITALS
DIASTOLIC BLOOD PRESSURE: 76 MMHG | TEMPERATURE: 96 F | BODY MASS INDEX: 32.35 KG/M2 | SYSTOLIC BLOOD PRESSURE: 124 MMHG | HEART RATE: 58 BPM | WEIGHT: 231.94 LBS

## 2017-05-26 DIAGNOSIS — J38.4 VOCAL FOLD EDEMA: ICD-10-CM

## 2017-05-26 DIAGNOSIS — J38.1 VOCAL FOLD POLYP: ICD-10-CM

## 2017-05-26 DIAGNOSIS — J38.4 VOCAL FOLD EDEMA: Primary | ICD-10-CM

## 2017-05-26 DIAGNOSIS — R49.9 VOICE DISTURBANCE: Primary | ICD-10-CM

## 2017-05-26 PROCEDURE — 99999 PR PBB SHADOW E&M-EST. PATIENT-LVL III: CPT | Mod: PBBFAC,,, | Performed by: OTOLARYNGOLOGY

## 2017-05-26 PROCEDURE — 92507 TX SP LANG VOICE COMM INDIV: CPT | Mod: GN | Performed by: SPEECH-LANGUAGE PATHOLOGIST

## 2017-05-26 PROCEDURE — 99499 UNLISTED E&M SERVICE: CPT | Mod: S$GLB,,, | Performed by: OTOLARYNGOLOGY

## 2017-05-26 PROCEDURE — G9172 VOICE GOAL STATUS: HCPCS | Mod: GN,CH | Performed by: SPEECH-LANGUAGE PATHOLOGIST

## 2017-05-26 PROCEDURE — G9173 VOICE D/C STATUS: HCPCS | Mod: GN,CI | Performed by: SPEECH-LANGUAGE PATHOLOGIST

## 2017-05-26 PROCEDURE — 31579 LARYNGOSCOPY TELESCOPIC: CPT | Mod: S$GLB,,, | Performed by: OTOLARYNGOLOGY

## 2017-05-26 PROCEDURE — G9171 VOICE CURRENT STATUS: HCPCS | Mod: GN,CI | Performed by: SPEECH-LANGUAGE PATHOLOGIST

## 2017-05-26 NOTE — PROGRESS NOTES
[facility]  Department of Otorhinolaryngology and Communication Sciences    Subjective:      Marco Marie is a 36 y.o. male who presents for follow-up. He has chronic dysphonia. Videostroboscopy at his initial visit demonstrated bilateral phonotraumatic changes and a left submucosal lesion which I also suspect to be phonotraumatic in etiology. I suspect these abnormalities to have been present for a long time, with a more recent mild exacerbation.    He has undergone his initial voice therapy session, and is having another today. He has been adherent to his home exercises. He has also integrated some behavioral/lifestyle changes to help his voice. Since his last visit, he has reported significant improvement. He notes less strain, less effort. He is no longer losing his voice as before. He is pleased with his progress.    He has noted continued reflux symptoms, but he is not adhering to the acid suppression regimen recommended by Ms. Lind.    Voice Handicap Index - 10 (VHI-10) score is 17 (down from 29).  Reflux symptom Index (RSI) score is 23 (down from 26).    The patient's medications, allergies, past medical, surgical, social and family histories were reviewed and updated as appropriate.    A detailed review of systems was obtained with pertinent positives as per the above HPI, and otherwise negative.      Objective:     /76   Pulse (!) 58   Temp 96.4 °F (35.8 °C)   Wt 105.2 kg (231 lb 14.8 oz)   BMI 32.35 kg/m²      Constitutional: comfortable, well dressed  Psychiatric: appropriate affect  Respiratory: comfortably breathing, symmetric chest rise, no stridor  Voice: mild, inconsistent roughness; less strain  Head: normocephalic  Eyes: conjunctivae and sclerae clear  Indirect laryngoscopy is limited due to gag    Procedure  Rigid Laryngeal Videostroboscopy (72684): Laryngeal videostroboscopy is indicated to assess the laryngeal vibratory biomechanics and vocal fold oscillation, which cannot  be assessed with a plain light examination. This was carried out with a 70 degree endoscope. After verbal consent was obtained, the patient was positioned and the tongue was gently secured with a gauze sponge. The endoscope was passed transorally and positioned to image the larynx and hypopharynx in detail. The following featured were examined: laryngeal and hypopharyngeal masses; vocal fold range and symmetry of motion; laryngeal mucosal edema, erythema, inflammation, and hydration; salivary pooling; and gross laryngeal sensation. During phonation, the vocal folds were assesses for glottal closure; mucosal wave; vocal fold lesions; vibratory periodicity, amplitude, and phase symmetry; and vertical height match. The equipment was removed. The patient tolerated the procedure well without complication. All findings were normal except:  - bilateral leukoedema of the free edges of the vocal folds  - prominence of the midmembranous region bilaterally, symmetric  - premature contact, near-hourglass closure  - trace glottal insufficiency  - impaired pliability  - supraglottic laryngeal hyperfunction  - interval resolution of left vocal fold polyp        Assessment:     Marco Marie is a 36 y.o. 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 He did have a vocal fold polyp which has resolved.     Plan:     Reassurance was provided. I recommended continued adherence to vocal hygiene measures and his voice therapy treatment plan.    He will follow up with me in the future on an as-needed basis.    All questions were answered, and the patient is in agreement with the plan.    Benjie Jimenes M.D.  [facility]  Department of Otorhinolaryngology and Communication Sciences

## 2017-05-26 NOTE — PROGRESS NOTES
"Referring provider: Alida Lind  Reason for visit:  Voice treatment (CPT 08957)  Session #1    History / Subjective   I had the pleasure of seeing Marco Marie for his first treatment session following complete voice evaluation on 4/10/17.  During that time, improvements were noted on SOVT voice exercises.  Since evaluation, he feels his voice has improved.  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  When he gives presentations at the office, his coworkers are helpful about reminding him when he is being a little too loud.  He reports he hasn't lost his voice at all, but does still feel vocally fatigued at the end of the day sometimes.      Objective   The primary goal of todays session was to practice home program.  This was targeted using SOVT treatment modalities.    Perceptual assessment:    -Quality: intermittently strained  -Volume: appropriate for age and gender/increased  -Pitch: appropriate for age and gender  -Flexibility: appropriate for age and gender  Habitual respiratory pattern: diaphragmatic    Data  Patient completed the following voice exercises.     Lg diameter straw phonation with 100% accuracy for reduced strain/improved frontal resonance   Sm diameter straw phonation with 90% accuracy for " "  Patient also provided further education re: reducing aggressive voice use; voice rest; practicing healthy voice habits at home. For home practice, clinician reviewed home exercises as practiced during the session with the patient.     VHI-10 (completed to assess self-perceived handicap associated with dysphonia; >11 considered abnormal): 17   RSI (completed to assess the possible presence and/or severity of LPR symptoms and any relationship between this condition and the pt's dysphagia; score of 15 may indicate LPR): 23    Functional " goals  Length Status Goal   Long term Ongoing Patient will implement and adhere to vocal hygiene protocols on a daily basis, including the elimination of phonotraumatic behaviors.    Long term Ongoing Patient and clinician will facilitate changes in vocal function in order to restore functional use of voice for daily occupational, social, and emotional demands.    Long term Ongoing Patient will re-establish phonation with adequate balance of airflow and resonance with decreased muscle tension.    Long term Met Patient will maximize efficiency of the vocal mechanism relative to existing laryngeal disorder through coordinating subsystems of voice within 12 weeks as evidenced by patient report and SLP observations.   Short term Ongoing Patient will coordinate vocal subsystems in hierarchical speech tasks by producing sound in an efficient manner yielding improved or normal voice quality and vocal endurance in the presence of existing laryngeal disorder with 90% accuracy independently.   Short term Ongoing / Met Patient will complete SOVT exercises and/or resonant-focused exercises 3-5x daily to strengthen and balance the intrinsic laryngeal musculature and maximize glottic closure without medial hyperfunction.   Short term Met Patient will increase awareness for voice conservations behaviors by following the 50/10 rule and reduce voice use in competing noise environments.    Short term Met Patient will demonstrate the ability to increase awareness of voicing behavior through self-monitoring to facilitate generalization in functional speaking situations with 80% accuracy.        Assessment     Marco Marie presents with mild-moderate dysphonia secondary to vocal fold polyp and laryngeal spasm as diagnosed by Dr. Jimenes.  He has made progress toward goals.     G-Codes for Voice  Current status:   - CI   Projected status:  - CH   Discharge status:  - CI     Recommendations / POC    Mr. Marie has made  progress toward his goals.  It is possible that he may benefit from 1-2 further sessions of voice therapy pending progress, but at this time, elects to defer further scheduled treatment.  He will contact me to schedule therapy in the future PRN, but was encouraged to re-connect with the [facility] especially if he experiences an acute voice change.

## 2017-08-22 RX ORDER — CIPROFLOXACIN AND DEXAMETHASONE 3; 1 MG/ML; MG/ML
4 SUSPENSION/ DROPS AURICULAR (OTIC) 2 TIMES DAILY
Qty: 7.5 ML | Refills: 0 | Status: SHIPPED | OUTPATIENT
Start: 2017-08-22 | End: 2018-05-07 | Stop reason: ALTCHOICE

## 2017-08-22 RX ORDER — CIPROFLOXACIN AND DEXAMETHASONE 3; 1 MG/ML; MG/ML
4 SUSPENSION/ DROPS AURICULAR (OTIC) 2 TIMES DAILY
Qty: 7.5 ML | Refills: 0 | Status: SHIPPED | OUTPATIENT
Start: 2017-08-22 | End: 2017-08-22 | Stop reason: SDUPTHER

## 2018-01-23 ENCOUNTER — CLINICAL SUPPORT (OUTPATIENT)
Dept: OCCUPATIONAL MEDICINE | Facility: CLINIC | Age: 38
End: 2018-01-23

## 2018-01-23 DIAGNOSIS — Z23 NEED FOR TDAP VACCINATION: Primary | ICD-10-CM

## 2018-01-23 PROCEDURE — 90714 TD VACC NO PRESV 7 YRS+ IM: CPT | Mod: S$GLB,,,

## 2018-03-17 ENCOUNTER — OFFICE VISIT (OUTPATIENT)
Dept: URGENT CARE | Facility: CLINIC | Age: 38
End: 2018-03-17
Payer: COMMERCIAL

## 2018-03-17 VITALS
WEIGHT: 231 LBS | OXYGEN SATURATION: 97 % | TEMPERATURE: 98 F | HEART RATE: 80 BPM | SYSTOLIC BLOOD PRESSURE: 122 MMHG | DIASTOLIC BLOOD PRESSURE: 86 MMHG | RESPIRATION RATE: 16 BRPM | HEIGHT: 71 IN | BODY MASS INDEX: 32.34 KG/M2

## 2018-03-17 DIAGNOSIS — M25.511 ACUTE PAIN OF RIGHT SHOULDER: Primary | ICD-10-CM

## 2018-03-17 PROCEDURE — 96372 THER/PROPH/DIAG INJ SC/IM: CPT | Mod: S$GLB,,, | Performed by: FAMILY MEDICINE

## 2018-03-17 PROCEDURE — 99214 OFFICE O/P EST MOD 30 MIN: CPT | Mod: 25,S$GLB,, | Performed by: FAMILY MEDICINE

## 2018-03-17 RX ORDER — NAPROXEN 500 MG/1
500 TABLET ORAL 2 TIMES DAILY WITH MEALS
Qty: 30 TABLET | Refills: 0 | Status: SHIPPED | OUTPATIENT
Start: 2018-03-17 | End: 2018-05-07 | Stop reason: ALTCHOICE

## 2018-03-17 RX ORDER — KETOROLAC TROMETHAMINE 30 MG/ML
30 INJECTION, SOLUTION INTRAMUSCULAR; INTRAVENOUS
Status: COMPLETED | OUTPATIENT
Start: 2018-03-17 | End: 2018-03-17

## 2018-03-17 RX ORDER — HYDROCODONE BITARTRATE AND ACETAMINOPHEN 5; 325 MG/1; MG/1
1 TABLET ORAL
Qty: 5 TABLET | Refills: 0 | Status: SHIPPED | OUTPATIENT
Start: 2018-03-17 | End: 2018-05-07 | Stop reason: ALTCHOICE

## 2018-03-17 RX ORDER — METHOCARBAMOL 500 MG/1
500 TABLET, FILM COATED ORAL 3 TIMES DAILY PRN
Qty: 30 TABLET | Refills: 0 | Status: SHIPPED | OUTPATIENT
Start: 2018-03-17 | End: 2018-03-27

## 2018-03-17 RX ADMIN — KETOROLAC TROMETHAMINE 30 MG: 30 INJECTION, SOLUTION INTRAMUSCULAR; INTRAVENOUS at 12:03

## 2018-03-17 NOTE — PROGRESS NOTES
"Subjective:       Patient ID: Marco Marie is a 37 y.o. male.    Vitals:  height is 5' 11" (1.803 m) and weight is 104.8 kg (231 lb). His temperature is 97.7 °F (36.5 °C). His blood pressure is 122/86 and his pulse is 80. His respiration is 16 and oxygen saturation is 97%.     Chief Complaint: Shoulder Pain    Shoulder Pain    The pain is present in the right shoulder. This is a new problem. The current episode started today. Pertinent negatives include no numbness.     Review of Systems   Constitution: Positive for weakness. Negative for malaise/fatigue.   HENT: Negative for nosebleeds.    Cardiovascular: Negative for chest pain and syncope.   Respiratory: Negative for shortness of breath.    Musculoskeletal: Positive for joint pain. Negative for back pain and neck pain.   Gastrointestinal: Negative for abdominal pain.   Genitourinary: Negative for hematuria.   Neurological: Negative for dizziness and numbness.       Objective:      Physical Exam   Constitutional: He is oriented to person, place, and time. He appears well-developed and well-nourished.   HENT:   Head: Normocephalic and atraumatic.   Nose: Nose normal.   Eyes: Conjunctivae, EOM and lids are normal.   Neck: Trachea normal, full passive range of motion without pain and phonation normal. Neck supple.   Cardiovascular: Normal rate.    Pulmonary/Chest: Effort normal.   Musculoskeletal: He exhibits tenderness. He exhibits no deformity.        Right shoulder: He exhibits decreased range of motion, tenderness, pain and spasm. He exhibits no deformity and normal strength.        Arms:  Neurological: He is alert and oriented to person, place, and time.   Skin: Skin is warm, dry and intact.   Psychiatric: He has a normal mood and affect. His speech is normal and behavior is normal. Judgment and thought content normal. Cognition and memory are normal.   Nursing note and vitals reviewed.      Assessment:       1. Acute pain of right shoulder        Plan:     "     Acute pain of right shoulder  -     Ambulatory referral to Orthopedics    Other orders  -     methocarbamol (ROBAXIN) 500 MG Tab; Take 1 tablet (500 mg total) by mouth 3 (three) times daily as needed.  Dispense: 30 tablet; Refill: 0  -     naproxen (NAPROSYN) 500 MG tablet; Take 1 tablet (500 mg total) by mouth 2 (two) times daily with meals.  Dispense: 30 tablet; Refill: 0  -     hydrocodone-acetaminophen 5-325mg (NORCO) 5-325 mg per tablet; Take 1 tablet by mouth every 24 hours as needed for Pain.  Dispense: 5 tablet; Refill: 0  -     ketorolac injection 30 mg; Inject 1 mL (30 mg total) into the muscle one time.

## 2018-03-18 ENCOUNTER — TELEPHONE (OUTPATIENT)
Dept: URGENT CARE | Facility: CLINIC | Age: 38
End: 2018-03-18

## 2018-05-07 PROBLEM — S29.011A STRAIN OF RIGHT PECTORALIS MUSCLE: Status: ACTIVE | Noted: 2018-05-07

## 2018-05-07 PROBLEM — S46.011A STRAIN OF MUSC/TEND THE ROTATOR CUFF OF RIGHT SHOULDER, INIT: Status: ACTIVE | Noted: 2018-05-07

## 2018-05-10 RX ORDER — OMEPRAZOLE 40 MG/1
CAPSULE, DELAYED RELEASE ORAL
Qty: 30 CAPSULE | Refills: 3 | OUTPATIENT
Start: 2018-05-10

## 2019-08-06 PROBLEM — K21.00 GERD WITH ESOPHAGITIS: Status: ACTIVE | Noted: 2019-08-06

## 2019-08-09 PROBLEM — J18.9 PNEUMONIA OF LEFT UPPER LOBE DUE TO INFECTIOUS ORGANISM: Status: ACTIVE | Noted: 2019-08-09

## 2020-11-12 PROBLEM — Z00.00 WELL ADULT EXAM: Status: ACTIVE | Noted: 2020-11-12

## 2021-02-15 PROBLEM — Z00.00 WELL ADULT EXAM: Status: RESOLVED | Noted: 2020-11-12 | Resolved: 2021-02-15

## 2021-08-06 ENCOUNTER — TELEPHONE (OUTPATIENT)
Dept: FAMILY MEDICINE | Facility: CLINIC | Age: 41
End: 2021-08-06